# Patient Record
Sex: MALE | Race: WHITE | NOT HISPANIC OR LATINO | Employment: UNEMPLOYED | URBAN - NONMETROPOLITAN AREA
[De-identification: names, ages, dates, MRNs, and addresses within clinical notes are randomized per-mention and may not be internally consistent; named-entity substitution may affect disease eponyms.]

---

## 2024-08-30 ENCOUNTER — HOSPITAL ENCOUNTER (EMERGENCY)
Facility: HOSPITAL | Age: 44
Discharge: HOME | End: 2024-08-30
Attending: STUDENT IN AN ORGANIZED HEALTH CARE EDUCATION/TRAINING PROGRAM
Payer: COMMERCIAL

## 2024-08-30 ENCOUNTER — APPOINTMENT (OUTPATIENT)
Dept: CT IMAGING | Facility: HOSPITAL | Age: 44
End: 2024-08-30
Payer: COMMERCIAL

## 2024-08-30 ENCOUNTER — APPOINTMENT (OUTPATIENT)
Dept: MRI IMAGING | Facility: HOSPITAL | Age: 44
End: 2024-08-30
Payer: COMMERCIAL

## 2024-08-30 ENCOUNTER — APPOINTMENT (OUTPATIENT)
Dept: GENERAL RADIOLOGY | Facility: HOSPITAL | Age: 44
End: 2024-08-30
Payer: COMMERCIAL

## 2024-08-30 VITALS
TEMPERATURE: 98.7 F | HEIGHT: 69 IN | HEART RATE: 82 BPM | RESPIRATION RATE: 18 BRPM | SYSTOLIC BLOOD PRESSURE: 144 MMHG | WEIGHT: 230 LBS | BODY MASS INDEX: 34.07 KG/M2 | DIASTOLIC BLOOD PRESSURE: 93 MMHG | OXYGEN SATURATION: 93 %

## 2024-08-30 DIAGNOSIS — F12.10 CANNABIS ABUSE, CONTINUOUS: ICD-10-CM

## 2024-08-30 DIAGNOSIS — F11.90 OPIATE USE: ICD-10-CM

## 2024-08-30 DIAGNOSIS — M54.16 CHRONIC RIGHT-SIDED LUMBAR RADICULOPATHY: Primary | ICD-10-CM

## 2024-08-30 LAB
ALBUMIN SERPL-MCNC: 3.7 G/DL (ref 3.5–5.2)
ALBUMIN/GLOB SERPL: 1.4 G/DL
ALP SERPL-CCNC: 94 U/L (ref 39–117)
ALT SERPL W P-5'-P-CCNC: 28 U/L (ref 1–41)
AMPHET+METHAMPHET UR QL: NEGATIVE
AMPHETAMINES UR QL: NEGATIVE
ANION GAP SERPL CALCULATED.3IONS-SCNC: 9.8 MMOL/L (ref 5–15)
AST SERPL-CCNC: 22 U/L (ref 1–40)
BARBITURATES UR QL SCN: NEGATIVE
BASOPHILS # BLD AUTO: 0.05 10*3/MM3 (ref 0–0.2)
BASOPHILS NFR BLD AUTO: 0.6 % (ref 0–1.5)
BENZODIAZ UR QL SCN: NEGATIVE
BILIRUB SERPL-MCNC: 0.2 MG/DL (ref 0–1.2)
BILIRUB UR QL STRIP: NEGATIVE
BUN SERPL-MCNC: 16 MG/DL (ref 6–20)
BUN/CREAT SERPL: 18.4 (ref 7–25)
BUPRENORPHINE SERPL-MCNC: NEGATIVE NG/ML
CALCIUM SPEC-SCNC: 8.7 MG/DL (ref 8.6–10.5)
CANNABINOIDS SERPL QL: POSITIVE
CHLORIDE SERPL-SCNC: 105 MMOL/L (ref 98–107)
CLARITY UR: CLEAR
CO2 SERPL-SCNC: 24.2 MMOL/L (ref 22–29)
COCAINE UR QL: NEGATIVE
COLOR UR: YELLOW
CREAT SERPL-MCNC: 0.87 MG/DL (ref 0.76–1.27)
CRP SERPL-MCNC: <0.3 MG/DL (ref 0–0.5)
DEPRECATED RDW RBC AUTO: 40.9 FL (ref 37–54)
EGFRCR SERPLBLD CKD-EPI 2021: 109.8 ML/MIN/1.73
EOSINOPHIL # BLD AUTO: 0.42 10*3/MM3 (ref 0–0.4)
EOSINOPHIL NFR BLD AUTO: 5.2 % (ref 0.3–6.2)
ERYTHROCYTE [DISTWIDTH] IN BLOOD BY AUTOMATED COUNT: 13.2 % (ref 12.3–15.4)
ERYTHROCYTE [SEDIMENTATION RATE] IN BLOOD: 5 MM/HR (ref 0–15)
FENTANYL UR-MCNC: POSITIVE NG/ML
GLOBULIN UR ELPH-MCNC: 2.7 GM/DL
GLUCOSE SERPL-MCNC: 114 MG/DL (ref 65–99)
GLUCOSE UR STRIP-MCNC: NEGATIVE MG/DL
HCT VFR BLD AUTO: 40 % (ref 37.5–51)
HGB BLD-MCNC: 13.7 G/DL (ref 13–17.7)
HGB UR QL STRIP.AUTO: NEGATIVE
HOLD SPECIMEN: NORMAL
HOLD SPECIMEN: NORMAL
IMM GRANULOCYTES # BLD AUTO: 0.02 10*3/MM3 (ref 0–0.05)
IMM GRANULOCYTES NFR BLD AUTO: 0.2 % (ref 0–0.5)
KETONES UR QL STRIP: NEGATIVE
LEUKOCYTE ESTERASE UR QL STRIP.AUTO: NEGATIVE
LYMPHOCYTES # BLD AUTO: 2.09 10*3/MM3 (ref 0.7–3.1)
LYMPHOCYTES NFR BLD AUTO: 25.9 % (ref 19.6–45.3)
MCH RBC QN AUTO: 28.9 PG (ref 26.6–33)
MCHC RBC AUTO-ENTMCNC: 34.3 G/DL (ref 31.5–35.7)
MCV RBC AUTO: 84.4 FL (ref 79–97)
METHADONE UR QL SCN: NEGATIVE
MONOCYTES # BLD AUTO: 0.74 10*3/MM3 (ref 0.1–0.9)
MONOCYTES NFR BLD AUTO: 9.2 % (ref 5–12)
NEUTROPHILS NFR BLD AUTO: 4.75 10*3/MM3 (ref 1.7–7)
NEUTROPHILS NFR BLD AUTO: 58.9 % (ref 42.7–76)
NITRITE UR QL STRIP: NEGATIVE
NRBC BLD AUTO-RTO: 0 /100 WBC (ref 0–0.2)
OPIATES UR QL: NEGATIVE
OXYCODONE UR QL SCN: POSITIVE
PCP UR QL SCN: NEGATIVE
PH UR STRIP.AUTO: 7 [PH] (ref 5–8)
PLATELET # BLD AUTO: 274 10*3/MM3 (ref 140–450)
PMV BLD AUTO: 8.7 FL (ref 6–12)
POTASSIUM SERPL-SCNC: 3.6 MMOL/L (ref 3.5–5.2)
PROT SERPL-MCNC: 6.4 G/DL (ref 6–8.5)
PROT UR QL STRIP: NEGATIVE
RBC # BLD AUTO: 4.74 10*6/MM3 (ref 4.14–5.8)
SODIUM SERPL-SCNC: 139 MMOL/L (ref 136–145)
SP GR UR STRIP: 1.02 (ref 1–1.03)
TRICYCLICS UR QL SCN: NEGATIVE
UROBILINOGEN UR QL STRIP: NORMAL
WBC NRBC COR # BLD AUTO: 8.07 10*3/MM3 (ref 3.4–10.8)
WHOLE BLOOD HOLD COAG: NORMAL
WHOLE BLOOD HOLD SPECIMEN: NORMAL

## 2024-08-30 PROCEDURE — 85025 COMPLETE CBC W/AUTO DIFF WBC: CPT

## 2024-08-30 PROCEDURE — 25010000002 HYDROMORPHONE PER 4 MG: Performed by: STUDENT IN AN ORGANIZED HEALTH CARE EDUCATION/TRAINING PROGRAM

## 2024-08-30 PROCEDURE — 73502 X-RAY EXAM HIP UNI 2-3 VIEWS: CPT | Performed by: RADIOLOGY

## 2024-08-30 PROCEDURE — 99284 EMERGENCY DEPT VISIT MOD MDM: CPT

## 2024-08-30 PROCEDURE — 80053 COMPREHEN METABOLIC PANEL: CPT

## 2024-08-30 PROCEDURE — 81003 URINALYSIS AUTO W/O SCOPE: CPT

## 2024-08-30 PROCEDURE — 96374 THER/PROPH/DIAG INJ IV PUSH: CPT

## 2024-08-30 PROCEDURE — 25010000002 KETOROLAC TROMETHAMINE PER 15 MG: Performed by: PHYSICIAN ASSISTANT

## 2024-08-30 PROCEDURE — 36415 COLL VENOUS BLD VENIPUNCTURE: CPT

## 2024-08-30 PROCEDURE — 25010000002 MORPHINE PER 10 MG: Performed by: STUDENT IN AN ORGANIZED HEALTH CARE EDUCATION/TRAINING PROGRAM

## 2024-08-30 PROCEDURE — 85652 RBC SED RATE AUTOMATED: CPT

## 2024-08-30 PROCEDURE — 96375 TX/PRO/DX INJ NEW DRUG ADDON: CPT

## 2024-08-30 PROCEDURE — 25010000002 FENTANYL CITRATE (PF) 50 MCG/ML SOLUTION: Performed by: STUDENT IN AN ORGANIZED HEALTH CARE EDUCATION/TRAINING PROGRAM

## 2024-08-30 PROCEDURE — 72148 MRI LUMBAR SPINE W/O DYE: CPT

## 2024-08-30 PROCEDURE — 25010000002 ONDANSETRON PER 1 MG: Performed by: STUDENT IN AN ORGANIZED HEALTH CARE EDUCATION/TRAINING PROGRAM

## 2024-08-30 PROCEDURE — 73502 X-RAY EXAM HIP UNI 2-3 VIEWS: CPT

## 2024-08-30 PROCEDURE — 86140 C-REACTIVE PROTEIN: CPT

## 2024-08-30 PROCEDURE — 80307 DRUG TEST PRSMV CHEM ANLYZR: CPT | Performed by: PHYSICIAN ASSISTANT

## 2024-08-30 PROCEDURE — 72148 MRI LUMBAR SPINE W/O DYE: CPT | Performed by: RADIOLOGY

## 2024-08-30 RX ORDER — ORPHENADRINE CITRATE 100 MG/1
100 TABLET, EXTENDED RELEASE ORAL EVERY 12 HOURS PRN
Qty: 10 TABLET | Refills: 0 | Status: SHIPPED | OUTPATIENT
Start: 2024-08-30

## 2024-08-30 RX ORDER — KETOROLAC TROMETHAMINE 30 MG/ML
30 INJECTION, SOLUTION INTRAMUSCULAR; INTRAVENOUS ONCE
Status: COMPLETED | OUTPATIENT
Start: 2024-08-30 | End: 2024-08-30

## 2024-08-30 RX ORDER — ORPHENADRINE CITRATE 100 MG/1
100 TABLET, EXTENDED RELEASE ORAL ONCE
Status: COMPLETED | OUTPATIENT
Start: 2024-08-30 | End: 2024-08-30

## 2024-08-30 RX ORDER — ORPHENADRINE CITRATE 100 MG/1
100 TABLET, EXTENDED RELEASE ORAL 2 TIMES DAILY
Qty: 20 TABLET | Refills: 0 | Status: SHIPPED | OUTPATIENT
Start: 2024-08-30

## 2024-08-30 RX ORDER — PREDNISONE 20 MG/1
20 TABLET ORAL 2 TIMES DAILY
Qty: 10 TABLET | Refills: 0 | Status: SHIPPED | OUTPATIENT
Start: 2024-08-30 | End: 2024-09-04

## 2024-08-30 RX ORDER — INDOMETHACIN 25 MG/1
25 CAPSULE ORAL 3 TIMES DAILY PRN
Qty: 10 CAPSULE | Refills: 0 | Status: SHIPPED | OUTPATIENT
Start: 2024-08-30

## 2024-08-30 RX ORDER — SODIUM CHLORIDE 0.9 % (FLUSH) 0.9 %
10 SYRINGE (ML) INJECTION AS NEEDED
Status: DISCONTINUED | OUTPATIENT
Start: 2024-08-30 | End: 2024-08-30 | Stop reason: HOSPADM

## 2024-08-30 RX ORDER — HYDROMORPHONE HYDROCHLORIDE 1 MG/ML
0.5 INJECTION, SOLUTION INTRAMUSCULAR; INTRAVENOUS; SUBCUTANEOUS ONCE
Status: COMPLETED | OUTPATIENT
Start: 2024-08-30 | End: 2024-08-30

## 2024-08-30 RX ORDER — ONDANSETRON 2 MG/ML
4 INJECTION INTRAMUSCULAR; INTRAVENOUS ONCE
Status: COMPLETED | OUTPATIENT
Start: 2024-08-30 | End: 2024-08-30

## 2024-08-30 RX ORDER — FENTANYL CITRATE 50 UG/ML
50 INJECTION, SOLUTION INTRAMUSCULAR; INTRAVENOUS ONCE
Status: COMPLETED | OUTPATIENT
Start: 2024-08-30 | End: 2024-08-30

## 2024-08-30 RX ADMIN — ONDANSETRON 4 MG: 2 INJECTION INTRAMUSCULAR; INTRAVENOUS at 07:58

## 2024-08-30 RX ADMIN — ORPHENADRINE CITRATE 100 MG: 100 TABLET, EXTENDED RELEASE ORAL at 10:50

## 2024-08-30 RX ADMIN — MORPHINE SULFATE 4 MG: 4 INJECTION, SOLUTION INTRAMUSCULAR; INTRAVENOUS at 10:07

## 2024-08-30 RX ADMIN — FENTANYL CITRATE 50 MCG: 50 INJECTION, SOLUTION INTRAMUSCULAR; INTRAVENOUS at 12:54

## 2024-08-30 RX ADMIN — KETOROLAC TROMETHAMINE 30 MG: 30 INJECTION, SOLUTION INTRAMUSCULAR; INTRAVENOUS at 09:27

## 2024-08-30 RX ADMIN — HYDROMORPHONE HYDROCHLORIDE 0.5 MG: 1 INJECTION, SOLUTION INTRAMUSCULAR; INTRAVENOUS; SUBCUTANEOUS at 07:58

## 2024-08-30 NOTE — ED NOTES
Pt requesting fentanyl for pain; states it works better than dilaudid. Dr. Gutierrez aware and states to continue with dilaudid order.

## 2024-08-30 NOTE — ED NOTES
Pt still requesting fentanyl instead of dilaudid; dr rollins spoke with patient and states she will change order. Will administer medication when ordered. Pt admits to medicating with oxycodone that he buys not prescribed for his pain at home.

## 2024-08-30 NOTE — ED PROVIDER NOTES
"Subjective   History of Present Illness  Patient is a 43-year-old male with no significant past medical history.  Patient presents with complaints of right hip pain.  Patient reports that this has been going on for a \"long time.\"  Patient reports that the pain became worse approximately 3 months ago and patient reports that he is now unable to ambulate related to the pain since this morning which is why he called EMS.  Patient reports that he has been seeing a doctor for this who told him he may have avascular necrosis in his right hip and an MRI is pending.  Patient reports that his MRI scheduled for September 3 however he does not believe that he can make it that long.  Patient denies any numbness or tingling in any of his extremities.  Patient also denies any loss of bowel or bladder.  Patient reports it is painful for him to ambulate, have a bowel movement, however denies any difficulties having a bowel movement.  Patient presents in no acute distress.  Patient is alert and oriented able to answer questions appropriately.  Patient presents EMS from his home.        Review of Systems   Constitutional: Negative.  Negative for fever.   HENT: Negative.     Respiratory: Negative.     Cardiovascular: Negative.  Negative for chest pain.   Gastrointestinal: Negative.  Negative for abdominal pain.   Endocrine: Negative.    Genitourinary: Negative.  Negative for dysuria.   Musculoskeletal:         Right hip pain   Skin: Negative.    Neurological: Negative.    Psychiatric/Behavioral: Negative.     All other systems reviewed and are negative.      History reviewed. No pertinent past medical history.    Allergies   Allergen Reactions    Gabapentin Shortness Of Breath and Rash       History reviewed. No pertinent surgical history.    History reviewed. No pertinent family history.    Social History     Socioeconomic History    Marital status:            Objective   Physical Exam  Vitals and nursing note reviewed. "   Constitutional:       General: He is not in acute distress.     Appearance: He is well-developed. He is not diaphoretic.   HENT:      Head: Normocephalic and atraumatic.      Right Ear: External ear normal.      Left Ear: External ear normal.      Nose: Nose normal.   Eyes:      Conjunctiva/sclera: Conjunctivae normal.      Pupils: Pupils are equal, round, and reactive to light.   Neck:      Vascular: No JVD.      Trachea: No tracheal deviation.   Cardiovascular:      Rate and Rhythm: Normal rate and regular rhythm.      Heart sounds: Normal heart sounds. No murmur heard.  Pulmonary:      Effort: Pulmonary effort is normal. No respiratory distress.      Breath sounds: Normal breath sounds. No wheezing.   Abdominal:      General: Bowel sounds are normal.      Palpations: Abdomen is soft.      Tenderness: There is no abdominal tenderness.   Musculoskeletal:         General: Tenderness present. No deformity. Normal range of motion.      Cervical back: Normal range of motion and neck supple.      Comments: Right hip pain   Skin:     General: Skin is warm and dry.      Coloration: Skin is not pale.      Findings: No erythema or rash.   Neurological:      Mental Status: He is alert and oriented to person, place, and time.      Cranial Nerves: No cranial nerve deficit.   Psychiatric:         Behavior: Behavior normal.         Thought Content: Thought content normal.         Procedures       Results for orders placed or performed during the hospital encounter of 08/30/24   Comprehensive Metabolic Panel    Specimen: Blood   Result Value Ref Range    Glucose 114 (H) 65 - 99 mg/dL    BUN 16 6 - 20 mg/dL    Creatinine 0.87 0.76 - 1.27 mg/dL    Sodium 139 136 - 145 mmol/L    Potassium 3.6 3.5 - 5.2 mmol/L    Chloride 105 98 - 107 mmol/L    CO2 24.2 22.0 - 29.0 mmol/L    Calcium 8.7 8.6 - 10.5 mg/dL    Total Protein 6.4 6.0 - 8.5 g/dL    Albumin 3.7 3.5 - 5.2 g/dL    ALT (SGPT) 28 1 - 41 U/L    AST (SGOT) 22 1 - 40 U/L     Alkaline Phosphatase 94 39 - 117 U/L    Total Bilirubin 0.2 0.0 - 1.2 mg/dL    Globulin 2.7 gm/dL    A/G Ratio 1.4 g/dL    BUN/Creatinine Ratio 18.4 7.0 - 25.0    Anion Gap 9.8 5.0 - 15.0 mmol/L    eGFR 109.8 >60.0 mL/min/1.73   Urinalysis With Microscopic If Indicated (No Culture) - Urine, Clean Catch    Specimen: Urine, Clean Catch   Result Value Ref Range    Color, UA Yellow Yellow, Straw    Appearance, UA Clear Clear    pH, UA 7.0 5.0 - 8.0    Specific Gravity, UA 1.023 1.005 - 1.030    Glucose, UA Negative Negative    Ketones, UA Negative Negative    Bilirubin, UA Negative Negative    Blood, UA Negative Negative    Protein, UA Negative Negative    Leuk Esterase, UA Negative Negative    Nitrite, UA Negative Negative    Urobilinogen, UA 0.2 E.U./dL 0.2 - 1.0 E.U./dL   C-reactive Protein    Specimen: Blood   Result Value Ref Range    C-Reactive Protein <0.30 0.00 - 0.50 mg/dL   Sedimentation Rate    Specimen: Blood   Result Value Ref Range    Sed Rate 5 0 - 15 mm/hr   CBC Auto Differential    Specimen: Blood   Result Value Ref Range    WBC 8.07 3.40 - 10.80 10*3/mm3    RBC 4.74 4.14 - 5.80 10*6/mm3    Hemoglobin 13.7 13.0 - 17.7 g/dL    Hematocrit 40.0 37.5 - 51.0 %    MCV 84.4 79.0 - 97.0 fL    MCH 28.9 26.6 - 33.0 pg    MCHC 34.3 31.5 - 35.7 g/dL    RDW 13.2 12.3 - 15.4 %    RDW-SD 40.9 37.0 - 54.0 fl    MPV 8.7 6.0 - 12.0 fL    Platelets 274 140 - 450 10*3/mm3    Neutrophil % 58.9 42.7 - 76.0 %    Lymphocyte % 25.9 19.6 - 45.3 %    Monocyte % 9.2 5.0 - 12.0 %    Eosinophil % 5.2 0.3 - 6.2 %    Basophil % 0.6 0.0 - 1.5 %    Immature Grans % 0.2 0.0 - 0.5 %    Neutrophils, Absolute 4.75 1.70 - 7.00 10*3/mm3    Lymphocytes, Absolute 2.09 0.70 - 3.10 10*3/mm3    Monocytes, Absolute 0.74 0.10 - 0.90 10*3/mm3    Eosinophils, Absolute 0.42 (H) 0.00 - 0.40 10*3/mm3    Basophils, Absolute 0.05 0.00 - 0.20 10*3/mm3    Immature Grans, Absolute 0.02 0.00 - 0.05 10*3/mm3    nRBC 0.0 0.0 - 0.2 /100 WBC   Urine Drug Screen -  Urine, Clean Catch    Specimen: Urine, Clean Catch   Result Value Ref Range    THC, Screen, Urine Positive (A) Negative    Phencyclidine (PCP), Urine Negative Negative    Cocaine Screen, Urine Negative Negative    Methamphetamine, Ur Negative Negative    Opiate Screen Negative Negative    Amphetamine Screen, Urine Negative Negative    Benzodiazepine Screen, Urine Negative Negative    Tricyclic Antidepressants Screen Negative Negative    Methadone Screen, Urine Negative Negative    Barbiturates Screen, Urine Negative Negative    Oxycodone Screen, Urine Positive (A) Negative    Buprenorphine, Screen, Urine Negative Negative   Fentanyl, Urine - Urine, Clean Catch    Specimen: Urine, Clean Catch   Result Value Ref Range    Fentanyl, Urine Positive (A) Negative   Green Top (Gel)   Result Value Ref Range    Extra Tube Hold for add-ons.    Lavender Top   Result Value Ref Range    Extra Tube hold for add-on    Gold Top - SST   Result Value Ref Range    Extra Tube Hold for add-ons.    Light Blue Top   Result Value Ref Range    Extra Tube Hold for add-ons.          ED Course  ED Course as of 08/30/24 1549   Fri Aug 30, 2024   0847   IMPRESSION:    No acute findings in the right hip.   [BH]   1004 I saw and evaluated the patient at bedside, after the patient was still stating that he was having a intractable pain after IV Dilaudid.  Upon assessing the patient, patient admits to complaints of chronic low back pain for 3 or 4 months but never been appropriately evaluated by physician including x-ray imaging CT imaging or MRI.    Reports-normally localized pain to the right sciatic joint lower lumbar spine at the L5-S1 junction that radiates into gluteus muscle upper thigh and groin.  -Reports that he can move the leg but the pain has progressively worsened.     -Pain other was demanding MRI of the right hip joint and IV fentanyl.    -I explained to patient that avascular necrosis is less likely the diagnosis- usually a chronic  "and indolent process usually with findings of elevated white count, inflammatory markers or possibly development of fever.    Patient denies known hypercoagulable diseases, history of trauma or fracture to the pelvis or femur, history of chemoradiation therapy to the pelvis or genetic bone defects.      On physical exam: Dorsalis pulses is equal bilaterally, 5 out of 5 strength in the lower extremities while resting in bed and still localizes pain to the right SI joint and right buttock area.     Recommended MRI of the lumbar spine and   patient will be medicated prior to MRI.    -Patient has a titanium plate in his left forearm in 2005 that family reports is MRI compatible  Electronically signed by Stefanie Gutierrez DO, 08/30/24, 10:05 AM EDT.    -Patient has a scheduled outpatient MRI of the right hip that is scheduled for September 3 which he can proceed without an outpatient.  Patient does not feel that he can lay completely still for at least 30 minutes and felt that MRI of the lumbar spine would be more appropriate medical workup to rule out additional medical causes [LK]   1140 Patient significant other continues to demand that I give patient additional pain medicine.  She reports that his pain is not controlled and I proceeded to agree to give an additional milligram of Dilaudid however when nursing staff presented to the bed to get medicine he refuse Dilaudid and verbally requested fentanyl.   [LK]   1235 Nursing staff informs me that he refused additional Dilaudid and continues to specifically request IV fentanyl.    I approached at bedside and discussed my extremely high suspicion that patient was drug-seeking and given the amount of IV pain medicine the patient's symptoms should have significantly improved unless he is chronically abusing opiates.  -  At this point in time patient states \"I been self-medicating off the streets.\"   -Patient states that he does not have a physician actively prescribing " "controlled substance for chronic pain. Patient states that he is taking at least 5 tablets daily of oxycodone 20 mg tablets.  -And then proceeded to explain to the patient that chronic use of opiates makes pain control extremely difficult however there is a threshold of risk versus benefits of additional opiates due to the sedating and hemodynamic stability effects and put in a single dose of fentanyl as he refused 1 mg of Dilaudid earlier in the shift.  -I have informed him that he will receive no additional pain medication he also was given Norflex 100 mg based on the medical evaluation assessment with lead charge nurse as well as patient's nurse pain has been adequately controlled and that at this time.   [LK]   1329 Patient continues to pain seek. Dr. Gutierrez has been talking with patient regarding his addiction problem. Patient is now upset and wishes to leave. We have given large amount of pain medication.  [BH]   1331 Patient once again refused MRI attempts because he was \"unable to tolerate MRI\"   MRI tech approached me and stated that the patient will not even attempt to get off the stretcher but he visualized the patient spontaneously moving all extremities symmetrically; which is also consistent of my physical exam findings when I have evaluated the patient minimum of 6 times over the past 3 and half hours.     Upon patient arriving back in the room I reassessed the patient once again with nursing staff at bedside.      At this point this makes at least the 6 time I have personally assessed the patient and patient is comfortably sleeping on his abdomen in no acute distress.;  With audible snoring upon approaching the room.  I Robert charge nurse Steph as well as patient's nurse, Carolina present to bedside with me.     I explained to the patient and significant other that I will not be prescribing or giving any additional pain medicine due to the patient sleeping and actively snoring.   Medical standard of care " goal is to reduce pain at least by50- 70% which I have done at this time. My personal assessment would be that patient was presenting appearing to have pain 8 or 9 out of 10 on arrival and I would rate his pain a 0.      Electronically signed by Stefanie Gutierrez DO, 08/30/24, 1:34 PM EDT.       [LK]   9026 OTHER FINDINGS:  Unable to confidently evaluate for spinal stenosis.     IMPRESSION:  1.  The study is markedly obscured due to extensive patient motion.  Study had to be performed problem, again limiting the exam.  2.  The vertebral body heights do appear to be adequately maintained as  do the disc space heights.  3.  Unable to confidently evaluate for spinal stenosis.      []   1531 Upon walking in the room to discuss results and is reassessed patient patient is comfortably sleeping on his belly and absolute no distress.      MRI results reviewed and predominantly inconclusive for spinal stenosis due to patient's motion artifact.  However disc heights were preserved with no retropulsion into the spinal cord.    -Epic and other states that he was on high-dose prednisone taper of 20 mg 3 weeks ago.  Recommended not proceeding with additional steroids until he was appropriately evaluated further with recommendations to follow-up with interventional pain management closest facility would be  or [place].    Avascular necrosis still remains low on the differential diagnosis.  Patient's labs are not consistent with avascular necrosis including a normal white count and normal inflammatory markers with pain continuing to be localized at the upper buttock lumbar area not within the right acetabular joint    Patient was sent in Norflex and all questions were answered prior to discharge.  Patient was reassessed with patient's nurse present in the room for formality.  Electronically signed by Stefanie Gutierrez DO, 08/30/24, 3:48 PM EDT.       [LK]      ED Course User Index  [BH] Aubrey Lassiter PA-C  [LK] Stefanie Gutierrez,  "DO                                     SOHAN reviewed by Nic Maddox MD, Stefanie Gutierrez DO       Medical Decision Making  Patient is a 43-year-old male with no significant past medical history.  Patient presents with complaints of right hip pain.  Patient reports that this has been going on for a \"long time.\"  Patient reports that the pain became worse approximately 3 months ago and patient reports that he is now unable to ambulate related to the pain since this morning which is why he called EMS.  Patient reports that he has been seeing a doctor for this who told him he may have avascular necrosis in his right hip and an MRI is pending.  Patient reports that his MRI scheduled for September 3 however he does not believe that he can make it that long.  Patient denies any numbness or tingling in any of his extremities.  Patient also denies any loss of bowel or bladder.  Patient reports it is painful for him to ambulate, have a bowel movement, however denies any difficulties having a bowel movement.  Patient presents in no acute distress.  Patient is alert and oriented able to answer questions appropriately.  Patient presents EMS from his home.    Problems Addressed:  Cannabis abuse, continuous: complicated acute illness or injury  Chronic right-sided lumbar radiculopathy: complicated acute illness or injury  Opiate use: complicated acute illness or injury    Amount and/or Complexity of Data Reviewed  Labs: ordered.  Radiology: ordered.    Risk  Prescription drug management.        Final diagnoses:   Chronic right-sided lumbar radiculopathy   Opiate use   Cannabis abuse, continuous       ED Disposition  ED Disposition       ED Disposition   Discharge    Condition   Stable    Comment   --               PATIENT CONNECTION - [facility]  See Provider List  [place] [address]  [phone]  Call in 1 day           Medication List        New Prescriptions      indomethacin 25 MG capsule  Commonly known as: " INDOCIN  Take 1 capsule by mouth 3 (Three) Times a Day As Needed for Mild Pain.     * orphenadrine 100 MG 12 hr tablet  Commonly known as: NORFLEX  Take 1 tablet by mouth 2 (Two) Times a Day.     * orphenadrine 100 MG 12 hr tablet  Commonly known as: NORFLEX  Take 1 tablet by mouth Every 12 (Twelve) Hours As Needed for Muscle Spasms or Mild Pain.     predniSONE 20 MG tablet  Commonly known as: DELTASONE  Take 1 tablet by mouth 2 (Two) Times a Day for 5 days.           * This list has 2 medication(s) that are the same as other medications prescribed for you. Read the directions carefully, and ask your doctor or other care provider to review them with you.                   Where to Get Your Medications        These medications were sent to [facility] [address] [place] [address] - [address] [address] - [phone]  - [phone]   [address] [address]      Phone: [phone]   indomethacin 25 MG capsule  orphenadrine 100 MG 12 hr tablet  orphenadrine 100 MG 12 hr tablet  predniSONE 20 MG tablet            Aubrey Lassiter PA-C  08/30/24 4146       Stefanie Gutierrez DO  08/30/24 2451

## 2024-08-30 NOTE — ED NOTES
Pt brought back to CT due to being unable to complete scan due to pain, Dr. Gutierrez to bedside to speak to patient and family.

## 2024-08-30 NOTE — ED NOTES
Pt ambulated to wheelchair with no assist; pt wife signed patents discharge papers and has no new questions or concerns. Pt still complains of pain but is able to tolerate a seated position. Pt handouts given.

## 2024-08-30 NOTE — ED NOTES
Pt family is requesting to have MRI instead of CT scan, they also request fentanyl for pain. Dr. Gutierrez spoke with patient and family.

## 2024-08-30 NOTE — ED NOTES
Pt was unable to tolerate MRI. Dr. Gutierrez and myself spoke with patient at bedside and patient states he cannot tolerate it; family voices concern about pain control and states she has contacted patient satisfaction to speak with them. Pain medication has been administered per order.